# Patient Record
Sex: MALE | Race: WHITE | URBAN - METROPOLITAN AREA
[De-identification: names, ages, dates, MRNs, and addresses within clinical notes are randomized per-mention and may not be internally consistent; named-entity substitution may affect disease eponyms.]

---

## 2020-09-02 ENCOUNTER — OFFICE VISIT (OUTPATIENT)
Dept: FAMILY MEDICINE CLINIC | Facility: CLINIC | Age: 30
End: 2020-09-02
Payer: COMMERCIAL

## 2020-09-02 ENCOUNTER — LAB ENCOUNTER (OUTPATIENT)
Dept: LAB | Age: 30
End: 2020-09-02
Attending: NURSE PRACTITIONER
Payer: COMMERCIAL

## 2020-09-02 ENCOUNTER — TELEPHONE (OUTPATIENT)
Dept: FAMILY MEDICINE CLINIC | Facility: CLINIC | Age: 30
End: 2020-09-02

## 2020-09-02 ENCOUNTER — HOSPITAL ENCOUNTER (OUTPATIENT)
Dept: GENERAL RADIOLOGY | Age: 30
Discharge: HOME | End: 2020-09-02
Attending: NURSE PRACTITIONER
Payer: COMMERCIAL

## 2020-09-02 VITALS
HEART RATE: 107 BPM | HEIGHT: 78 IN | DIASTOLIC BLOOD PRESSURE: 72 MMHG | OXYGEN SATURATION: 99 % | SYSTOLIC BLOOD PRESSURE: 110 MMHG | BODY MASS INDEX: 22.91 KG/M2 | TEMPERATURE: 98 F | RESPIRATION RATE: 22 BRPM | WEIGHT: 198 LBS

## 2020-09-02 DIAGNOSIS — R11.2 NON-INTRACTABLE VOMITING WITH NAUSEA, UNSPECIFIED VOMITING TYPE: ICD-10-CM

## 2020-09-02 DIAGNOSIS — R10.84 GENERALIZED ABDOMINAL PAIN: ICD-10-CM

## 2020-09-02 DIAGNOSIS — R10.84 GENERALIZED ABDOMINAL PAIN: Primary | ICD-10-CM

## 2020-09-02 PROBLEM — G44.329 CHRONIC POST-TRAUMATIC HEADACHE, NOT INTRACTABLE: Status: ACTIVE | Noted: 2018-07-02

## 2020-09-02 LAB
ALBUMIN SERPL-MCNC: 4.2 G/DL (ref 3.4–5)
ALBUMIN/GLOB SERPL: 1.4 {RATIO} (ref 1–2)
ALP LIVER SERPL-CCNC: 78 U/L (ref 45–117)
ALT SERPL-CCNC: 28 U/L (ref 16–61)
AMYLASE SERPL-CCNC: 68 U/L (ref 25–115)
ANION GAP SERPL CALC-SCNC: 5 MMOL/L (ref 0–18)
AST SERPL-CCNC: 16 U/L (ref 15–37)
BASOPHILS # BLD AUTO: 0.06 X10(3) UL (ref 0–0.2)
BASOPHILS NFR BLD AUTO: 0.6 %
BILIRUB SERPL-MCNC: 1.2 MG/DL (ref 0.1–2)
BUN BLD-MCNC: 13 MG/DL (ref 7–18)
BUN/CREAT SERPL: 12.4 (ref 10–20)
CALCIUM BLD-MCNC: 9.2 MG/DL (ref 8.5–10.1)
CHLORIDE SERPL-SCNC: 109 MMOL/L (ref 98–112)
CO2 SERPL-SCNC: 27 MMOL/L (ref 21–32)
CREAT BLD-MCNC: 1.05 MG/DL (ref 0.7–1.3)
DEPRECATED RDW RBC AUTO: 47.4 FL (ref 35.1–46.3)
EOSINOPHIL # BLD AUTO: 0.32 X10(3) UL (ref 0–0.7)
EOSINOPHIL NFR BLD AUTO: 3.3 %
ERYTHROCYTE [DISTWIDTH] IN BLOOD BY AUTOMATED COUNT: 13.2 % (ref 11–15)
GLOBULIN PLAS-MCNC: 3.1 G/DL (ref 2.8–4.4)
GLUCOSE BLD-MCNC: 80 MG/DL (ref 70–99)
HCT VFR BLD AUTO: 46.7 % (ref 39–53)
HGB BLD-MCNC: 14.8 G/DL (ref 13–17.5)
IMM GRANULOCYTES # BLD AUTO: 0.04 X10(3) UL (ref 0–1)
IMM GRANULOCYTES NFR BLD: 0.4 %
LIPASE SERPL-CCNC: 196 U/L (ref 73–393)
LYMPHOCYTES # BLD AUTO: 3.07 X10(3) UL (ref 1–4)
LYMPHOCYTES NFR BLD AUTO: 31.9 %
M PROTEIN MFR SERPL ELPH: 7.3 G/DL (ref 6.4–8.2)
MCH RBC QN AUTO: 30.5 PG (ref 26–34)
MCHC RBC AUTO-ENTMCNC: 31.7 G/DL (ref 31–37)
MCV RBC AUTO: 96.1 FL (ref 80–100)
MONOCYTES # BLD AUTO: 0.5 X10(3) UL (ref 0.1–1)
MONOCYTES NFR BLD AUTO: 5.2 %
NEUTROPHILS # BLD AUTO: 5.64 X10 (3) UL (ref 1.5–7.7)
NEUTROPHILS # BLD AUTO: 5.64 X10(3) UL (ref 1.5–7.7)
NEUTROPHILS NFR BLD AUTO: 58.6 %
OSMOLALITY SERPL CALC.SUM OF ELEC: 291 MOSM/KG (ref 275–295)
PATIENT FASTING Y/N/NP: NO
PLATELET # BLD AUTO: 306 10(3)UL (ref 150–450)
POTASSIUM SERPL-SCNC: 3.9 MMOL/L (ref 3.5–5.1)
RBC # BLD AUTO: 4.86 X10(6)UL (ref 4.3–5.7)
SODIUM SERPL-SCNC: 141 MMOL/L (ref 136–145)
T4 FREE SERPL-MCNC: 1.3 NG/DL (ref 0.8–1.7)
TSI SER-ACNC: 1.66 MIU/ML (ref 0.36–3.74)
WBC # BLD AUTO: 9.6 X10(3) UL (ref 4–11)

## 2020-09-02 PROCEDURE — 3008F BODY MASS INDEX DOCD: CPT | Performed by: NURSE PRACTITIONER

## 2020-09-02 PROCEDURE — 36415 COLL VENOUS BLD VENIPUNCTURE: CPT | Performed by: NURSE PRACTITIONER

## 2020-09-02 PROCEDURE — 99204 OFFICE O/P NEW MOD 45 MIN: CPT | Performed by: NURSE PRACTITIONER

## 2020-09-02 PROCEDURE — 80050 GENERAL HEALTH PANEL: CPT | Performed by: NURSE PRACTITIONER

## 2020-09-02 PROCEDURE — 74018 RADEX ABDOMEN 1 VIEW: CPT | Performed by: NURSE PRACTITIONER

## 2020-09-02 PROCEDURE — 3074F SYST BP LT 130 MM HG: CPT | Performed by: NURSE PRACTITIONER

## 2020-09-02 PROCEDURE — 84439 ASSAY OF FREE THYROXINE: CPT | Performed by: NURSE PRACTITIONER

## 2020-09-02 PROCEDURE — 82150 ASSAY OF AMYLASE: CPT | Performed by: NURSE PRACTITIONER

## 2020-09-02 PROCEDURE — 3078F DIAST BP <80 MM HG: CPT | Performed by: NURSE PRACTITIONER

## 2020-09-02 PROCEDURE — 83690 ASSAY OF LIPASE: CPT | Performed by: NURSE PRACTITIONER

## 2020-09-02 RX ORDER — NICOTINE POLACRILEX 4 MG/1
20 GUM, CHEWING ORAL EVERY MORNING
Qty: 30 TABLET | Refills: 0 | Status: SHIPPED | OUTPATIENT
Start: 2020-09-02 | End: 2020-10-02

## 2020-09-02 RX ORDER — SACCHAROMYCES BOULARDII 250 MG
250 CAPSULE ORAL 2 TIMES DAILY
Qty: 28 CAPSULE | Refills: 0 | Status: SHIPPED | OUTPATIENT
Start: 2020-09-02 | End: 2020-09-16

## 2020-09-02 RX ORDER — ONDANSETRON 4 MG/1
4 TABLET, FILM COATED ORAL EVERY 8 HOURS PRN
Qty: 20 TABLET | Refills: 0 | Status: SHIPPED | OUTPATIENT
Start: 2020-09-02 | End: 2021-05-11

## 2020-09-02 NOTE — PROGRESS NOTES
[facility] [place]  PROGRESS NOTE  Chief Complaint:   Patient presents with:  Nausea: x 2 weeks  Vomiting  Stomach Pain      HPI:   This is a 31year old male coming in for nausea, vomiting, and stomach pain for two weeks.     Patient is a new pa stress though no significant high stress precipitating symptom onset. Has had a history of upper endoscopies x2 in the past, patient's father with a history of gentile's esophagus. Patient with a history of [place], does not take medication regularly. activity intolerance. RESPIRATORY:  Denies shortness of breath, wheezing, cough or sputum. GASTROINTESTINAL:  See HPI. GENITOURINARY: Denies urinary changes, frequency, malodorous nor cloudy urine, nor blood in the urine. Denies urinary incontinence. auscultation heart rate of 86. LUNGS: Clear to auscultation bilterally, no rales/rhonchi/wheezing. CHEST: No tenderness. ABDOMEN:  Soft, flat, nondistended, tender with palpation in mid upper as well as left upper and left lower abdomen.   bowel sounds Future  - LIPASE; Future  - AMYLASE; Future  - XR ABDOMEN (1 VIEW) (CPT=74018); Future  - Ondansetron HCl (ZOFRAN) 4 mg tablet; Take 1 tablet (4 mg total) by mouth every 8 (eight) hours as needed for Nausea. Dispense: 20 tablet;  Refill: 0    Patient at 79 home.  Start zofran 4mg every 8 hours as needed for nausea. Start probiotic twice a day for the next two weeks.   Clear liquid diet until improvement in symptoms; try to incorporate some broth and electrolyte drinks (Gatorade, Pedialyte, Pedialyte popsicle

## 2020-09-02 NOTE — TELEPHONE ENCOUNTER
Patient has not been seen in our office. Based on symptoms I would prefer he be seen in office secondary to symptoms, may need lab work and vital signs as well.

## 2020-09-02 NOTE — PATIENT INSTRUCTIONS
Start omeprazole 20mg every morning, take with a glass of water; okay to take today when you get home. Start zofran 4mg every 8 hours as needed for nausea. Start probiotic twice a day for the next two weeks. Miralax 17g once a day.   Clear liquid diet un

## 2020-09-02 NOTE — TELEPHONE ENCOUNTER
Patient c/o continuous nausea and stomach pain for 13-14 days. States the nausea is from the time he wakes up til the time he goes to bed. Patient states after he eats it is a \"crap shoot\" as to whether or not it is going to come back up.   States in th

## 2020-09-03 ENCOUNTER — TELEPHONE (OUTPATIENT)
Dept: FAMILY MEDICINE CLINIC | Facility: CLINIC | Age: 30
End: 2020-09-03

## 2020-09-03 NOTE — TELEPHONE ENCOUNTER
Continue treatment for now, it's been less than 24 hours on current treatment plan. Call tomorrow morning with an update.

## 2020-09-03 NOTE — TELEPHONE ENCOUNTER
----- Message from YOLIE Dominguez sent at 9/3/2020  8:05 AM CDT -----  Patient's thyroid panel in normal range. Patient's electrolytes in normal range, kidney function stable. Pancrease enzymes without abnormalities.    CBC without elevated white

## 2020-09-03 NOTE — TELEPHONE ENCOUNTER
Patient informed of the below results. Patient states he has \"no real improvement. \"  States the antinausea medication is seeming to help and he has not vomited since his appt.   States he did have a couple dry heaving spells this morning but nothing came

## 2020-09-04 ENCOUNTER — OFFICE VISIT (OUTPATIENT)
Dept: FAMILY MEDICINE CLINIC | Facility: CLINIC | Age: 30
End: 2020-09-04
Payer: COMMERCIAL

## 2020-09-04 VITALS
OXYGEN SATURATION: 98 % | HEIGHT: 78 IN | RESPIRATION RATE: 22 BRPM | HEART RATE: 67 BPM | BODY MASS INDEX: 23.14 KG/M2 | WEIGHT: 200 LBS | DIASTOLIC BLOOD PRESSURE: 58 MMHG | SYSTOLIC BLOOD PRESSURE: 90 MMHG | TEMPERATURE: 97 F

## 2020-09-04 DIAGNOSIS — R07.81 PLEURITIC CHEST PAIN: ICD-10-CM

## 2020-09-04 DIAGNOSIS — Z00.00 HEALTHCARE MAINTENANCE: Primary | ICD-10-CM

## 2020-09-04 DIAGNOSIS — J32.9 FREQUENT SINUS INFECTIONS: ICD-10-CM

## 2020-09-04 DIAGNOSIS — G44.329 CHRONIC POST-TRAUMATIC HEADACHE, NOT INTRACTABLE: ICD-10-CM

## 2020-09-04 DIAGNOSIS — R11.15 EMESIS, PERSISTENT: ICD-10-CM

## 2020-09-04 DIAGNOSIS — R11.14 BILIOUS VOMITING WITH NAUSEA: ICD-10-CM

## 2020-09-04 DIAGNOSIS — K21.9 GASTROESOPHAGEAL REFLUX DISEASE WITHOUT ESOPHAGITIS: ICD-10-CM

## 2020-09-04 PROCEDURE — 99395 PREV VISIT EST AGE 18-39: CPT | Performed by: FAMILY MEDICINE

## 2020-09-04 PROCEDURE — 3008F BODY MASS INDEX DOCD: CPT | Performed by: FAMILY MEDICINE

## 2020-09-04 PROCEDURE — 3074F SYST BP LT 130 MM HG: CPT | Performed by: FAMILY MEDICINE

## 2020-09-04 PROCEDURE — 3078F DIAST BP <80 MM HG: CPT | Performed by: FAMILY MEDICINE

## 2020-09-04 RX ORDER — PAROXETINE HYDROCHLORIDE 20 MG/1
20 TABLET, FILM COATED ORAL EVERY MORNING
Qty: 30 TABLET | Refills: 5 | Status: SHIPPED | OUTPATIENT
Start: 2020-09-04 | End: 2021-04-26

## 2020-09-04 NOTE — PROGRESS NOTES
[facility] [place]  PROGRESS NOTE  Chief Complaint:   Patient presents with:  Physical      HPI:   This is a 31year old male coming in for establishing care. He has a number of concerns today.   First of all, he said that he is nauseated virt A/G Ratio 1.4 1.0 - 2.0    FASTING No    TSH+FREE T4   Result Value Ref Range    Free T4 1.3 0.8 - 1.7 ng/dL    TSH 1.660 0.358 - 3.740 mIU/mL   LIPASE   Result Value Ref Range    Lipase 196 73 - 393 U/L   AMYLASE   Result Value Ref Range    Amylase 68 Medications   Medication Sig Dispense Refill   • PARoxetine HCl (PAXIL) 20 MG Oral Tab Take 1 tablet (20 mg total) by mouth every morning.  30 tablet 5   • Ondansetron HCl (ZOFRAN) 4 mg tablet Take 1 tablet (4 mg total) by mouth every 8 (eight) hours as nee from the following:    Height as of this encounter: 82\". Weight as of this encounter: 200 lb (90.7 kg). Vital signs reviewed. Appears stated age, well groomed. Physical Exam:  GEN: He is alert. He is very tall and slender.   He has a very wide arm reflux disease. He does take omeprazole as needed for that. 5. Frequent sinus infections  He has had frequent sinus infections. He said he does not have one now. 6. Pleuritic chest pain  He has episodic pleuritic chest pain.   He denies any chest pa

## 2021-04-26 RX ORDER — PAROXETINE HYDROCHLORIDE 20 MG/1
20 TABLET, FILM COATED ORAL EVERY MORNING
Qty: 30 TABLET | Refills: 0 | Status: SHIPPED | OUTPATIENT
Start: 2021-04-26 | End: 2021-05-12

## 2021-04-26 NOTE — TELEPHONE ENCOUNTER
Paroxetine:       Recheck in 1 month.     Future appt:    Last Appointment with provider:    9/4/20    Last appointment at [facility]:  Visit date not found  No results found for: CHOLEST, HDL, LDL, TRIGLY, TRIG  No results found for: EAG, A1C  Lab Result

## 2021-05-11 DIAGNOSIS — R11.2 NON-INTRACTABLE VOMITING WITH NAUSEA, UNSPECIFIED VOMITING TYPE: ICD-10-CM

## 2021-05-11 RX ORDER — ONDANSETRON 4 MG/1
4 TABLET, FILM COATED ORAL EVERY 8 HOURS PRN
Qty: 20 TABLET | Refills: 1 | Status: SHIPPED | OUTPATIENT
Start: 2021-05-11

## 2021-05-11 NOTE — TELEPHONE ENCOUNTER
Ondansetron:  9/2/20    Future appt:    Last Appointment with provider: 9/4/20    Last appointment at [facility]:  Visit date not found  No results found for: CHOLEST, HDL, LDL, TRIGLY, TRIG  No results found for: EAG, A1C  Lab Results   Component Value

## 2021-05-12 NOTE — TELEPHONE ENCOUNTER
Paroxetine:  4/26/21    Patient was to have followed up 10/2020. [employer] Message Sent.   Neelima Moore, 05/12/21, 11:07 AM

## 2021-05-15 NOTE — TELEPHONE ENCOUNTER
[employer] message read but patient did not schedule an appt. LM for patient to return call to schedule an appt.

## 2021-05-17 RX ORDER — PAROXETINE HYDROCHLORIDE 20 MG/1
20 TABLET, FILM COATED ORAL EVERY MORNING
Qty: 90 TABLET | Refills: 0 | Status: SHIPPED | OUTPATIENT
Start: 2021-05-17 | End: 2021-11-06

## 2021-05-17 NOTE — TELEPHONE ENCOUNTER
Good news. It appears that the Paxil has been effective. I will renew it for another 90 days but I would like to have a visit at some point in the next couple of months just to double check and make sure that everything is working well.

## 2021-11-08 RX ORDER — PAROXETINE HYDROCHLORIDE 20 MG/1
20 TABLET, FILM COATED ORAL EVERY MORNING
Qty: 30 TABLET | Refills: 0 | Status: SHIPPED | OUTPATIENT
Start: 2021-11-08 | End: 2022-01-25

## 2022-01-25 ENCOUNTER — TELEPHONE (OUTPATIENT)
Dept: FAMILY MEDICINE CLINIC | Facility: CLINIC | Age: 32
End: 2022-01-25

## 2022-01-25 RX ORDER — PAROXETINE HYDROCHLORIDE 20 MG/1
20 TABLET, FILM COATED ORAL EVERY MORNING
Qty: 30 TABLET | Refills: 1 | Status: SHIPPED | OUTPATIENT
Start: 2022-01-25

## 2022-01-25 NOTE — TELEPHONE ENCOUNTER
Pt came in for appt today and when we entered his insurance it was discovered that he has an insurance we cannot see him with. Pt is asking for a short term supply of Paroxetine to be sent to [facility] in [place] while he sorts out his insurance information.

## 2022-01-25 NOTE — TELEPHONE ENCOUNTER
Patient did not realize he could not come to [facility] with current IPA. Requesting short term refill of paroxetine to get him through until he can get his insurance change or find a new provider. Please advise.   Laura Waggoner CMA, 01/25/22, 2:46 PM

## 2024-05-21 ENCOUNTER — APPOINTMENT (OUTPATIENT)
Dept: FAMILY MEDICINE | Age: 34
End: 2024-05-21

## 2024-05-21 VITALS
HEIGHT: 78 IN | SYSTOLIC BLOOD PRESSURE: 114 MMHG | TEMPERATURE: 97.6 F | BODY MASS INDEX: 24.47 KG/M2 | WEIGHT: 211.53 LBS | DIASTOLIC BLOOD PRESSURE: 62 MMHG | HEART RATE: 76 BPM

## 2024-05-21 DIAGNOSIS — K92.1 BLOOD IN THE STOOL: ICD-10-CM

## 2024-05-21 DIAGNOSIS — Z00.00 HEALTHCARE MAINTENANCE: ICD-10-CM

## 2024-05-21 DIAGNOSIS — K21.00 GASTROESOPHAGEAL REFLUX DISEASE WITH ESOPHAGITIS, UNSPECIFIED WHETHER HEMORRHAGE: ICD-10-CM

## 2024-05-21 DIAGNOSIS — Z23 NEED FOR COVID-19 VACCINE: ICD-10-CM

## 2024-05-21 DIAGNOSIS — F41.9 ANXIETY DISORDER, UNSPECIFIED TYPE: Primary | ICD-10-CM

## 2024-05-21 PROCEDURE — 90480 ADMN SARSCOV2 VAC 1/ONLY CMP: CPT | Performed by: FAMILY MEDICINE

## 2024-05-21 PROCEDURE — 99204 OFFICE O/P NEW MOD 45 MIN: CPT | Performed by: FAMILY MEDICINE

## 2024-05-21 PROCEDURE — 91322 SARSCOV2 VAC 50 MCG/0.5ML IM: CPT | Performed by: FAMILY MEDICINE

## 2024-05-21 PROCEDURE — G2211 COMPLEX E/M VISIT ADD ON: HCPCS | Performed by: FAMILY MEDICINE

## 2024-05-21 RX ORDER — PAROXETINE 10 MG/1
10 TABLET, FILM COATED ORAL EVERY MORNING
Qty: 30 TABLET | Refills: 1 | Status: SHIPPED | OUTPATIENT
Start: 2024-05-21

## 2024-05-21 RX ORDER — FAMOTIDINE 20 MG/1
20 TABLET, FILM COATED ORAL 2 TIMES DAILY
Qty: 60 TABLET | Refills: 1 | Status: SHIPPED | OUTPATIENT
Start: 2024-05-21

## 2024-05-21 ASSESSMENT — PATIENT HEALTH QUESTIONNAIRE - PHQ9
SUM OF ALL RESPONSES TO PHQ9 QUESTIONS 1 AND 2: 2
CLINICAL INTERPRETATION OF PHQ2 SCORE: NO FURTHER SCREENING NEEDED
SUM OF ALL RESPONSES TO PHQ9 QUESTIONS 1 AND 2: 2
1. LITTLE INTEREST OR PLEASURE IN DOING THINGS: SEVERAL DAYS
2. FEELING DOWN, DEPRESSED OR HOPELESS: SEVERAL DAYS
1. LITTLE INTEREST OR PLEASURE IN DOING THINGS: SEVERAL DAYS
SUM OF ALL RESPONSES TO PHQ9 QUESTIONS 1 AND 2: 2
CLINICAL INTERPRETATION OF PHQ2 SCORE: NO FURTHER SCREENING NEEDED
2. FEELING DOWN, DEPRESSED OR HOPELESS: SEVERAL DAYS
SUM OF ALL RESPONSES TO PHQ9 QUESTIONS 1 AND 2: 2

## 2024-05-21 ASSESSMENT — PAIN SCALES - GENERAL: PAINLEVEL: 0

## 2024-07-08 ENCOUNTER — EXTERNAL RECORD (OUTPATIENT)
Dept: HEALTH INFORMATION MANAGEMENT | Facility: OTHER | Age: 34
End: 2024-07-08

## 2024-07-30 RX ORDER — PAROXETINE 10 MG/1
10 TABLET, FILM COATED ORAL EVERY MORNING
Qty: 30 TABLET | Refills: 1 | Status: SHIPPED | OUTPATIENT
Start: 2024-07-30

## 2024-09-13 ASSESSMENT — ACTIVITIES OF DAILY LIVING (ADL)
ADL_SCORE: 12
HISTORY OF FALLING IN THE LAST YEAR (PRIOR TO ADMISSION): NO
ADL_BEFORE_ADMISSION: INDEPENDENT
ADL_BEFORE_ADMISSION: INDEPENDENT
HISTORY OF FALLING IN THE LAST YEAR (PRIOR TO ADMISSION): NO
ADL_SHORT_OF_BREATH: NO
ADL_SHORT_OF_BREATH: NO
NEEDS_ASSIST: NO
RECENT_DECLINE_ADL: NO
NEEDS_ASSIST: NO
RECENT_DECLINE_ADL: NO
ADL_SCORE: 12

## 2024-09-19 ENCOUNTER — APPOINTMENT (OUTPATIENT)
Dept: GASTROENTEROLOGY | Age: 34
End: 2024-09-19
Attending: INTERNAL MEDICINE

## 2024-09-24 ENCOUNTER — APPOINTMENT (OUTPATIENT)
Dept: FAMILY MEDICINE | Age: 34
End: 2024-09-24

## 2024-09-24 VITALS
SYSTOLIC BLOOD PRESSURE: 104 MMHG | TEMPERATURE: 98.4 F | HEART RATE: 70 BPM | WEIGHT: 203.04 LBS | HEIGHT: 78 IN | DIASTOLIC BLOOD PRESSURE: 70 MMHG | BODY MASS INDEX: 23.49 KG/M2

## 2024-09-24 DIAGNOSIS — Z23 FLU VACCINE NEED: ICD-10-CM

## 2024-09-24 DIAGNOSIS — F41.9 ANXIETY DISORDER, UNSPECIFIED TYPE: Primary | ICD-10-CM

## 2024-09-24 DIAGNOSIS — K21.00 GASTROESOPHAGEAL REFLUX DISEASE WITH ESOPHAGITIS, UNSPECIFIED WHETHER HEMORRHAGE: ICD-10-CM

## 2024-09-24 DIAGNOSIS — Z00.00 HEALTHCARE MAINTENANCE: ICD-10-CM

## 2024-09-24 RX ORDER — PAROXETINE 10 MG/1
10 TABLET, FILM COATED ORAL EVERY MORNING
Qty: 30 TABLET | Refills: 0 | OUTPATIENT
Start: 2024-09-24

## 2024-09-24 RX ORDER — PAROXETINE 10 MG/1
10 TABLET, FILM COATED ORAL EVERY MORNING
Qty: 90 TABLET | Refills: 0 | Status: SHIPPED | OUTPATIENT
Start: 2024-09-24

## 2024-09-24 RX ORDER — FAMOTIDINE 20 MG/1
20 TABLET, FILM COATED ORAL 2 TIMES DAILY
Qty: 180 TABLET | Refills: 0 | Status: SHIPPED | OUTPATIENT
Start: 2024-09-24

## 2024-09-24 ASSESSMENT — PAIN SCALES - GENERAL: PAINLEVEL: 0

## 2024-09-30 ENCOUNTER — ANESTHESIA EVENT (OUTPATIENT)
Dept: GASTROENTEROLOGY | Age: 34
End: 2024-09-30

## 2024-09-30 ENCOUNTER — HOSPITAL ENCOUNTER (OUTPATIENT)
Dept: GASTROENTEROLOGY | Age: 34
Discharge: HOME | End: 2024-09-30
Attending: INTERNAL MEDICINE

## 2024-09-30 ENCOUNTER — ANESTHESIA (OUTPATIENT)
Dept: GASTROENTEROLOGY | Age: 34
End: 2024-09-30

## 2024-09-30 VITALS
BODY MASS INDEX: 22.93 KG/M2 | TEMPERATURE: 98.3 F | RESPIRATION RATE: 13 BRPM | HEIGHT: 78 IN | SYSTOLIC BLOOD PRESSURE: 123 MMHG | HEART RATE: 73 BPM | DIASTOLIC BLOOD PRESSURE: 75 MMHG | WEIGHT: 198.19 LBS | OXYGEN SATURATION: 98 %

## 2024-09-30 DIAGNOSIS — K92.1 BLOOD IN STOOL: ICD-10-CM

## 2024-09-30 DIAGNOSIS — R10.9 ABDOMINAL PAIN, UNSPECIFIED ABDOMINAL LOCATION: ICD-10-CM

## 2024-09-30 DIAGNOSIS — Z01.812 PRE-PROCEDURAL LABORATORY EXAMINATION: Primary | ICD-10-CM

## 2024-09-30 PROCEDURE — 13000001 HB PHASE II RECOVERY EA 30 MINUTES

## 2024-09-30 PROCEDURE — 10002807 HB RX 258: Performed by: STUDENT IN AN ORGANIZED HEALTH CARE EDUCATION/TRAINING PROGRAM

## 2024-09-30 PROCEDURE — 10002807 HB RX 258: Performed by: INTERNAL MEDICINE

## 2024-09-30 PROCEDURE — 10004451 HB PACU RECOVERY 1ST 30 MINUTES

## 2024-09-30 PROCEDURE — 10002800 HB RX 250 W HCPCS: Performed by: STUDENT IN AN ORGANIZED HEALTH CARE EDUCATION/TRAINING PROGRAM

## 2024-09-30 PROCEDURE — 13000029 HB GI MAJOR COMPLEX CASE EA ADD MINUTE

## 2024-09-30 PROCEDURE — 13000008 HB ANESTHESIA MAC OUTSIDE OR

## 2024-09-30 PROCEDURE — 88305 TISSUE EXAM BY PATHOLOGIST: CPT | Performed by: INTERNAL MEDICINE

## 2024-09-30 PROCEDURE — 13000028 HB GI MAJOR COMPLEX CASE S/U + 1ST 15 MIN

## 2024-09-30 RX ORDER — SODIUM CHLORIDE, SODIUM LACTATE, POTASSIUM CHLORIDE, CALCIUM CHLORIDE 600; 310; 30; 20 MG/100ML; MG/100ML; MG/100ML; MG/100ML
INJECTION, SOLUTION INTRAVENOUS CONTINUOUS
Status: DISCONTINUED | OUTPATIENT
Start: 2024-09-30 | End: 2024-10-02 | Stop reason: HOSPADM

## 2024-09-30 RX ORDER — SODIUM CHLORIDE, SODIUM LACTATE, POTASSIUM CHLORIDE, CALCIUM CHLORIDE 600; 310; 30; 20 MG/100ML; MG/100ML; MG/100ML; MG/100ML
INJECTION, SOLUTION INTRAVENOUS CONTINUOUS PRN
Status: DISCONTINUED | OUTPATIENT
Start: 2024-09-30 | End: 2024-09-30

## 2024-09-30 RX ORDER — 0.9 % SODIUM CHLORIDE 0.9 %
2 VIAL (ML) INJECTION EVERY 12 HOURS SCHEDULED
Status: DISCONTINUED | OUTPATIENT
Start: 2024-09-30 | End: 2024-10-02 | Stop reason: HOSPADM

## 2024-09-30 RX ADMIN — SODIUM CHLORIDE, POTASSIUM CHLORIDE, SODIUM LACTATE AND CALCIUM CHLORIDE: 600; 310; 30; 20 INJECTION, SOLUTION INTRAVENOUS at 09:38

## 2024-09-30 RX ADMIN — SODIUM CHLORIDE, POTASSIUM CHLORIDE, SODIUM LACTATE AND CALCIUM CHLORIDE: 600; 310; 30; 20 INJECTION, SOLUTION INTRAVENOUS at 11:20

## 2024-09-30 RX ADMIN — PROPOFOL INJECTABLE EMULSION 200 MCG/KG/MIN: 10 INJECTION, EMULSION INTRAVENOUS at 11:23

## 2024-09-30 RX ADMIN — PROPOFOL INJECTABLE EMULSION 150 MG: 10 INJECTION, EMULSION INTRAVENOUS at 11:22

## 2024-09-30 SDOH — SOCIAL STABILITY: SOCIAL INSECURITY: HOW OFTEN DOES ANYONE, INCLUDING FAMILY AND FRIENDS, SCREAM OR CURSE AT YOU?: NEVER

## 2024-09-30 SDOH — SOCIAL STABILITY: SOCIAL INSECURITY: HOW OFTEN DOES ANYONE, INCLUDING FAMILY AND FRIENDS, THREATEN YOU WITH HARM?: NEVER

## 2024-09-30 SDOH — SOCIAL STABILITY: SOCIAL INSECURITY: HOW OFTEN DOES ANYONE, INCLUDING FAMILY AND FRIENDS, INSULT OR TALK DOWN TO YOU?: NEVER

## 2024-09-30 SDOH — SOCIAL STABILITY: SOCIAL INSECURITY: HOW OFTEN DOES ANYONE, INCLUDING FAMILY AND FRIENDS, PHYSICALLY HURT YOU?: NEVER

## 2024-09-30 ASSESSMENT — ACTIVITIES OF DAILY LIVING (ADL)
ADL_BEFORE_ADMISSION: INDEPENDENT
RECENT_DECLINE_ADL: NO
HISTORY OF FALLING IN THE LAST YEAR (PRIOR TO ADMISSION): NO
SENSORY_SUPPORT_DEVICES: EYEGLASSES
ADL_SHORT_OF_BREATH: NO
ADL_SCORE: 12
NEEDS_ASSIST: NO
CHRONIC_PAIN_PRESENT: NO

## 2024-09-30 ASSESSMENT — PAIN SCALES - GENERAL
PAINLEVEL_OUTOF10: 0

## 2024-09-30 ASSESSMENT — ENCOUNTER SYMPTOMS: EXERCISE TOLERANCE: GOOD (>4 METS)

## 2024-10-02 LAB
ASR DISCLAIMER: NORMAL
CASE RPRT: NORMAL
CLINICAL INFO: NORMAL
PATH REPORT.FINAL DX SPEC: NORMAL
PATH REPORT.GROSS SPEC: NORMAL

## 2024-12-04 RX ORDER — PAROXETINE 10 MG/1
10 TABLET, FILM COATED ORAL EVERY MORNING
Qty: 90 TABLET | Refills: 0 | Status: SHIPPED | OUTPATIENT
Start: 2024-12-04

## 2025-03-21 RX ORDER — PAROXETINE 10 MG/1
10 TABLET, FILM COATED ORAL EVERY MORNING
Qty: 90 TABLET | Refills: 0 | Status: SHIPPED | OUTPATIENT
Start: 2025-03-21

## 2025-03-25 ENCOUNTER — APPOINTMENT (OUTPATIENT)
Dept: FAMILY MEDICINE | Age: 35
End: 2025-03-25

## 2025-03-25 VITALS
OXYGEN SATURATION: 97 % | SYSTOLIC BLOOD PRESSURE: 100 MMHG | RESPIRATION RATE: 16 BRPM | BODY MASS INDEX: 24.56 KG/M2 | DIASTOLIC BLOOD PRESSURE: 60 MMHG | HEIGHT: 78 IN | HEART RATE: 82 BPM | TEMPERATURE: 98.6 F | WEIGHT: 212.3 LBS

## 2025-03-25 DIAGNOSIS — Z00.00 HEALTHCARE MAINTENANCE: Primary | ICD-10-CM

## 2025-03-25 PROCEDURE — 99395 PREV VISIT EST AGE 18-39: CPT | Performed by: FAMILY MEDICINE

## 2025-03-25 ASSESSMENT — PATIENT HEALTH QUESTIONNAIRE - PHQ9
SUM OF ALL RESPONSES TO PHQ9 QUESTIONS 1 AND 2: 0
1. LITTLE INTEREST OR PLEASURE IN DOING THINGS: NOT AT ALL
SUM OF ALL RESPONSES TO PHQ9 QUESTIONS 1 AND 2: 0
2. FEELING DOWN, DEPRESSED OR HOPELESS: NOT AT ALL
CLINICAL INTERPRETATION OF PHQ2 SCORE: NO FURTHER SCREENING NEEDED

## 2025-06-12 RX ORDER — PAROXETINE 10 MG/1
10 TABLET, FILM COATED ORAL EVERY MORNING
Qty: 90 TABLET | Refills: 0 | Status: SHIPPED | OUTPATIENT
Start: 2025-06-12

## 2025-08-18 RX ORDER — PAROXETINE 10 MG/1
10 TABLET, FILM COATED ORAL EVERY MORNING
Qty: 90 TABLET | Refills: 1 | Status: SHIPPED | OUTPATIENT
Start: 2025-08-18